# Patient Record
Sex: FEMALE | URBAN - METROPOLITAN AREA
[De-identification: names, ages, dates, MRNs, and addresses within clinical notes are randomized per-mention and may not be internally consistent; named-entity substitution may affect disease eponyms.]

---

## 2021-10-09 ENCOUNTER — EMERGENCY (EMERGENCY)
Facility: HOSPITAL | Age: 46
LOS: 0 days | Discharge: HOME | End: 2021-10-09
Payer: COMMERCIAL

## 2021-10-09 VITALS
SYSTOLIC BLOOD PRESSURE: 134 MMHG | OXYGEN SATURATION: 98 % | WEIGHT: 199.96 LBS | RESPIRATION RATE: 16 BRPM | HEIGHT: 64 IN | DIASTOLIC BLOOD PRESSURE: 86 MMHG | HEART RATE: 101 BPM | TEMPERATURE: 99 F
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
## 2021-10-09 DIAGNOSIS — S40.812A ABRASION OF LEFT UPPER ARM, INITIAL ENCOUNTER: ICD-10-CM

## 2021-10-09 DIAGNOSIS — E03.9 HYPOTHYROIDISM, UNSPECIFIED: ICD-10-CM
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
## 2021-10-09 DIAGNOSIS — S50.812A ABRASION OF LEFT FOREARM, INITIAL ENCOUNTER: ICD-10-CM

## 2021-10-09 DIAGNOSIS — M54.2 CERVICALGIA: ICD-10-CM

## 2021-10-09 LAB
HCG UR QL: NEGATIVE — SIGNIFICANT CHANGE UP

## 2021-10-09 PROCEDURE — 99284 EMERGENCY DEPT VISIT MOD MDM: CPT

## 2021-10-09 RX ORDER — METHOCARBAMOL 500 MG/1
1000 TABLET, FILM COATED ORAL ONCE
Refills: 0 | Status: COMPLETED | OUTPATIENT
Start: 2021-10-09 | End: 2021-10-09

## 2021-10-09 RX ORDER — METHOCARBAMOL 500 MG/1
1 TABLET, FILM COATED ORAL
Qty: 10 | Refills: 0
Start: 2021-10-09 | End: 2021-10-13

## 2021-10-09 RX ORDER — IBUPROFEN 200 MG
1 TABLET ORAL
Qty: 20 | Refills: 0
Start: 2021-10-09 | End: 2021-10-13

## 2021-10-09 RX ORDER — ACETAMINOPHEN 500 MG
975 TABLET ORAL ONCE
Refills: 0 | Status: COMPLETED | OUTPATIENT
Start: 2021-10-09 | End: 2021-10-09

## 2021-10-09 RX ORDER — IBUPROFEN 200 MG
600 TABLET ORAL ONCE
Refills: 0 | Status: COMPLETED | OUTPATIENT
Start: 2021-10-09 | End: 2021-10-09

## 2021-10-09 RX ADMIN — Medication 975 MILLIGRAM(S): at 12:43
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
## 2021-10-09 NOTE — ED PROVIDER NOTE - NSPTACCESSSVCSAPPTDETAILS_ED_ALL_ED_FT
Follow up with your regular Dr or clinic above in 2 days. Return to ER if you feel worse, or have new symptoms.

## 2021-10-09 NOTE — ED PROVIDER NOTE - PATIENT PORTAL LINK FT
You can access the FollowMyHealth Patient Portal offered by [facility] by registering at the following website: [email or web address] By joining [email or web address]’s FollowMyHealth portal, you will also be able to view your health information using other applications (apps) compatible with our system.

## 2021-10-09 NOTE — ED PROVIDER NOTE - PHYSICAL EXAMINATION
+abrasion to L upper arm with excoriation no bony TTP  +abrasion to forearm  necK + B/L trape TTP  back - no TTP  Ambulatory  abd- soft non TTp
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
## 2022-01-19 NOTE — ED ADULT TRIAGE NOTE - SOURCE OF INFORMATION
Continue breathing treatments.  Empiric medications for possible early pneumonia.  Return with any new or worsening symptoms, or any concerns.       EMS

## 2022-10-10 ENCOUNTER — OFFICE (OUTPATIENT)
Dept: URBAN - METROPOLITAN AREA CLINIC 35 | Facility: CLINIC | Age: 47
Setting detail: OPHTHALMOLOGY
End: 2022-10-10
Payer: COMMERCIAL

## 2022-10-10 ENCOUNTER — RX ONLY (RX ONLY)
Age: 47
End: 2022-10-10

## 2022-10-10 DIAGNOSIS — H52.7: ICD-10-CM

## 2022-10-10 DIAGNOSIS — H11.153: ICD-10-CM

## 2022-10-10 DIAGNOSIS — D31.01: ICD-10-CM

## 2022-10-10 DIAGNOSIS — H52.4: ICD-10-CM

## 2022-10-10 PROCEDURE — 92015 DETERMINE REFRACTIVE STATE: CPT | Performed by: OPHTHALMOLOGY

## 2022-10-10 PROCEDURE — 92004 COMPRE OPH EXAM NEW PT 1/>: CPT | Performed by: OPHTHALMOLOGY

## 2022-10-10 ASSESSMENT — REFRACTION_MANIFEST
OD_AXIS: 065
OS_AXIS: 105
OD_ADD: +1.25
OS_VA1: 20/20
OD_AXIS: 065
OD_SPHERE: -4.75
OD_CYLINDER: +0.50
OD_VA1: 20/20
OD_VA1: 20/20
OS_SPHERE: -4.75
OS_CYLINDER: +0.25
OS_VA1: 20/20
OS_AXIS: 100
OS_CYLINDER: +0.50
OD_CYLINDER: +0.50
OS_SPHERE: -5.00
OD_SPHERE: -4.75
OS_ADD: +1.25

## 2022-10-10 ASSESSMENT — REFRACTION_CURRENTRX
OD_VPRISM_DIRECTION: SV
OS_SPHERE: -4.00
OD_OVR_VA: 20/
OS_OVR_VA: 20/
OS_AXIS: 0
OS_CYLINDER: SPHERE
OS_VPRISM_DIRECTION: SV
OD_CYLINDER: +0.50
OD_AXIS: 054
OD_SPHERE: -4.25

## 2022-10-10 ASSESSMENT — AXIALLENGTH_DERIVED
OD_AL: 24.9611
OD_AL: 24.9611
OS_AL: 25.1747
OS_AL: 25.1747
OD_AL: 24.9611
OS_AL: 25.1194

## 2022-10-10 ASSESSMENT — SPHEQUIV_DERIVED
OS_SPHEQUIV: -4.75
OD_SPHEQUIV: -4.5
OS_SPHEQUIV: -4.75
OD_SPHEQUIV: -4.5
OS_SPHEQUIV: -4.625
OD_SPHEQUIV: -4.5

## 2022-10-10 ASSESSMENT — KERATOMETRY
OS_AXISANGLE_DEGREES: 095
OD_AXISANGLE_DEGREES: 087
OD_K2POWER_DIOPTERS: 45.25
OS_K1POWER_DIOPTERS: 44.00
OS_K2POWER_DIOPTERS: 45.00
OD_K1POWER_DIOPTERS: 44.25

## 2022-10-10 ASSESSMENT — VISUAL ACUITY
OS_BCVA: 20/20-1
OD_BCVA: 20/20-1

## 2022-10-10 ASSESSMENT — TONOMETRY
OD_IOP_MMHG: 16
OS_IOP_MMHG: 16

## 2022-10-10 ASSESSMENT — REFRACTION_AUTOREFRACTION
OS_AXIS: 103
OD_AXIS: 064
OD_CYLINDER: +0.50
OS_CYLINDER: +0.50
OD_SPHERE: -4.75
OS_SPHERE: -5.00

## 2022-10-10 ASSESSMENT — CONFRONTATIONAL VISUAL FIELD TEST (CVF)
OD_FINDINGS: FULL
OS_FINDINGS: FULL

## 2023-11-01 RX ORDER — CHOLECALCIFEROL (VITAMIN D3) 125 MCG
0 CAPSULE ORAL
Qty: 0 | Refills: 0 | DISCHARGE

## 2023-11-01 RX ORDER — LEVOTHYROXINE SODIUM 125 MCG
0 TABLET ORAL
Qty: 0 | Refills: 0 | DISCHARGE